# Patient Record
Sex: MALE | Race: BLACK OR AFRICAN AMERICAN | NOT HISPANIC OR LATINO | Employment: FULL TIME | URBAN - METROPOLITAN AREA
[De-identification: names, ages, dates, MRNs, and addresses within clinical notes are randomized per-mention and may not be internally consistent; named-entity substitution may affect disease eponyms.]

---

## 2024-01-11 ENCOUNTER — HOSPITAL ENCOUNTER (EMERGENCY)
Facility: HOSPITAL | Age: 64
Discharge: HOME | End: 2024-01-12
Attending: STUDENT IN AN ORGANIZED HEALTH CARE EDUCATION/TRAINING PROGRAM
Payer: COMMERCIAL

## 2024-01-11 ENCOUNTER — CLINICAL SUPPORT (OUTPATIENT)
Dept: EMERGENCY MEDICINE | Facility: HOSPITAL | Age: 64
End: 2024-01-11
Payer: COMMERCIAL

## 2024-01-11 ENCOUNTER — APPOINTMENT (OUTPATIENT)
Dept: RADIOLOGY | Facility: HOSPITAL | Age: 64
End: 2024-01-11
Payer: COMMERCIAL

## 2024-01-11 DIAGNOSIS — R07.89 CHEST WALL PAIN: ICD-10-CM

## 2024-01-11 DIAGNOSIS — G89.29 CHRONIC NONINTRACTABLE HEADACHE, UNSPECIFIED HEADACHE TYPE: Primary | ICD-10-CM

## 2024-01-11 DIAGNOSIS — R51.9 CHRONIC NONINTRACTABLE HEADACHE, UNSPECIFIED HEADACHE TYPE: Primary | ICD-10-CM

## 2024-01-11 DIAGNOSIS — R10.84 GENERALIZED ABDOMINAL PAIN: ICD-10-CM

## 2024-01-11 LAB
ALBUMIN SERPL BCP-MCNC: 4.3 G/DL (ref 3.4–5)
ALP SERPL-CCNC: 89 U/L (ref 33–136)
ALT SERPL W P-5'-P-CCNC: 21 U/L (ref 10–52)
ANION GAP SERPL CALC-SCNC: 13 MMOL/L (ref 10–20)
APPEARANCE UR: CLEAR
APTT PPP: 36 SECONDS (ref 27–38)
AST SERPL W P-5'-P-CCNC: 20 U/L (ref 9–39)
BASOPHILS # BLD AUTO: 0.05 X10*3/UL (ref 0–0.1)
BASOPHILS NFR BLD AUTO: 0.6 %
BILIRUB SERPL-MCNC: 0.4 MG/DL (ref 0–1.2)
BILIRUB UR STRIP.AUTO-MCNC: NEGATIVE MG/DL
BUN SERPL-MCNC: 15 MG/DL (ref 6–23)
CALCIUM SERPL-MCNC: 9.5 MG/DL (ref 8.6–10.6)
CARDIAC TROPONIN I PNL SERPL HS: 15 NG/L (ref 0–53)
CHLORIDE SERPL-SCNC: 103 MMOL/L (ref 98–107)
CO2 SERPL-SCNC: 27 MMOL/L (ref 21–32)
COLOR UR: NORMAL
CREAT SERPL-MCNC: 0.86 MG/DL (ref 0.5–1.3)
EGFRCR SERPLBLD CKD-EPI 2021: >90 ML/MIN/1.73M*2
EOSINOPHIL # BLD AUTO: 0.4 X10*3/UL (ref 0–0.7)
EOSINOPHIL NFR BLD AUTO: 4.4 %
ERYTHROCYTE [DISTWIDTH] IN BLOOD BY AUTOMATED COUNT: 14.3 % (ref 11.5–14.5)
GLUCOSE SERPL-MCNC: 104 MG/DL (ref 74–99)
GLUCOSE UR STRIP.AUTO-MCNC: NEGATIVE MG/DL
HCT VFR BLD AUTO: 37.2 % (ref 41–52)
HGB BLD-MCNC: 12.8 G/DL (ref 13.5–17.5)
IMM GRANULOCYTES # BLD AUTO: 0.02 X10*3/UL (ref 0–0.7)
IMM GRANULOCYTES NFR BLD AUTO: 0.2 % (ref 0–0.9)
INR PPP: 1 (ref 0.9–1.1)
KETONES UR STRIP.AUTO-MCNC: NEGATIVE MG/DL
LEUKOCYTE ESTERASE UR QL STRIP.AUTO: NEGATIVE
LYMPHOCYTES # BLD AUTO: 2.52 X10*3/UL (ref 1.2–4.8)
LYMPHOCYTES NFR BLD AUTO: 28 %
MCH RBC QN AUTO: 28.6 PG (ref 26–34)
MCHC RBC AUTO-ENTMCNC: 34.4 G/DL (ref 32–36)
MCV RBC AUTO: 83 FL (ref 80–100)
MONOCYTES # BLD AUTO: 0.76 X10*3/UL (ref 0.1–1)
MONOCYTES NFR BLD AUTO: 8.5 %
NEUTROPHILS # BLD AUTO: 5.24 X10*3/UL (ref 1.2–7.7)
NEUTROPHILS NFR BLD AUTO: 58.3 %
NITRITE UR QL STRIP.AUTO: NEGATIVE
NRBC BLD-RTO: 0 /100 WBCS (ref 0–0)
PH UR STRIP.AUTO: 6 [PH]
PLATELET # BLD AUTO: 276 X10*3/UL (ref 150–450)
POTASSIUM SERPL-SCNC: 4 MMOL/L (ref 3.5–5.3)
PROT SERPL-MCNC: 7.1 G/DL (ref 6.4–8.2)
PROT UR STRIP.AUTO-MCNC: NEGATIVE MG/DL
PROTHROMBIN TIME: 11.7 SECONDS (ref 9.8–12.8)
RBC # BLD AUTO: 4.47 X10*6/UL (ref 4.5–5.9)
RBC # UR STRIP.AUTO: NEGATIVE /UL
SODIUM SERPL-SCNC: 139 MMOL/L (ref 136–145)
SP GR UR STRIP.AUTO: 1.01
UROBILINOGEN UR STRIP.AUTO-MCNC: <2 MG/DL
WBC # BLD AUTO: 9 X10*3/UL (ref 4.4–11.3)

## 2024-01-11 PROCEDURE — 99285 EMERGENCY DEPT VISIT HI MDM: CPT | Performed by: STUDENT IN AN ORGANIZED HEALTH CARE EDUCATION/TRAINING PROGRAM

## 2024-01-11 PROCEDURE — 83690 ASSAY OF LIPASE: CPT

## 2024-01-11 PROCEDURE — 36415 COLL VENOUS BLD VENIPUNCTURE: CPT | Performed by: EMERGENCY MEDICINE

## 2024-01-11 PROCEDURE — 81003 URINALYSIS AUTO W/O SCOPE: CPT

## 2024-01-11 PROCEDURE — 84484 ASSAY OF TROPONIN QUANT: CPT | Performed by: STUDENT IN AN ORGANIZED HEALTH CARE EDUCATION/TRAINING PROGRAM

## 2024-01-11 PROCEDURE — 93010 ELECTROCARDIOGRAM REPORT: CPT | Performed by: STUDENT IN AN ORGANIZED HEALTH CARE EDUCATION/TRAINING PROGRAM

## 2024-01-11 PROCEDURE — 83880 ASSAY OF NATRIURETIC PEPTIDE: CPT

## 2024-01-11 PROCEDURE — 2500000004 HC RX 250 GENERAL PHARMACY W/ HCPCS (ALT 636 FOR OP/ED)

## 2024-01-11 PROCEDURE — 93005 ELECTROCARDIOGRAM TRACING: CPT

## 2024-01-11 PROCEDURE — 84075 ASSAY ALKALINE PHOSPHATASE: CPT | Performed by: STUDENT IN AN ORGANIZED HEALTH CARE EDUCATION/TRAINING PROGRAM

## 2024-01-11 PROCEDURE — 85730 THROMBOPLASTIN TIME PARTIAL: CPT

## 2024-01-11 PROCEDURE — 71045 X-RAY EXAM CHEST 1 VIEW: CPT | Performed by: STUDENT IN AN ORGANIZED HEALTH CARE EDUCATION/TRAINING PROGRAM

## 2024-01-11 PROCEDURE — 71045 X-RAY EXAM CHEST 1 VIEW: CPT

## 2024-01-11 PROCEDURE — 85025 COMPLETE CBC W/AUTO DIFF WBC: CPT | Performed by: STUDENT IN AN ORGANIZED HEALTH CARE EDUCATION/TRAINING PROGRAM

## 2024-01-11 PROCEDURE — 99284 EMERGENCY DEPT VISIT MOD MDM: CPT | Mod: 25 | Performed by: STUDENT IN AN ORGANIZED HEALTH CARE EDUCATION/TRAINING PROGRAM

## 2024-01-11 PROCEDURE — 96374 THER/PROPH/DIAG INJ IV PUSH: CPT

## 2024-01-11 PROCEDURE — 36415 COLL VENOUS BLD VENIPUNCTURE: CPT

## 2024-01-11 PROCEDURE — 85025 COMPLETE CBC W/AUTO DIFF WBC: CPT | Performed by: EMERGENCY MEDICINE

## 2024-01-11 RX ORDER — MORPHINE SULFATE 4 MG/ML
4 INJECTION INTRAVENOUS ONCE
Status: COMPLETED | OUTPATIENT
Start: 2024-01-11 | End: 2024-01-11

## 2024-01-11 RX ADMIN — MORPHINE SULFATE 4 MG: 4 INJECTION INTRAVENOUS at 23:54

## 2024-01-11 ASSESSMENT — COLUMBIA-SUICIDE SEVERITY RATING SCALE - C-SSRS
2. HAVE YOU ACTUALLY HAD ANY THOUGHTS OF KILLING YOURSELF?: NO
6. HAVE YOU EVER DONE ANYTHING, STARTED TO DO ANYTHING, OR PREPARED TO DO ANYTHING TO END YOUR LIFE?: NO
1. IN THE PAST MONTH, HAVE YOU WISHED YOU WERE DEAD OR WISHED YOU COULD GO TO SLEEP AND NOT WAKE UP?: NO

## 2024-01-11 ASSESSMENT — PAIN DESCRIPTION - PAIN TYPE: TYPE: CHRONIC PAIN

## 2024-01-11 ASSESSMENT — PAIN SCALES - GENERAL
PAINLEVEL_OUTOF10: 8
PAINLEVEL_OUTOF10: 9

## 2024-01-11 ASSESSMENT — PAIN DESCRIPTION - LOCATION: LOCATION: HEAD

## 2024-01-11 ASSESSMENT — PAIN - FUNCTIONAL ASSESSMENT: PAIN_FUNCTIONAL_ASSESSMENT: 0-10

## 2024-01-12 ENCOUNTER — APPOINTMENT (OUTPATIENT)
Dept: RADIOLOGY | Facility: HOSPITAL | Age: 64
End: 2024-01-12
Payer: COMMERCIAL

## 2024-01-12 VITALS
DIASTOLIC BLOOD PRESSURE: 77 MMHG | WEIGHT: 180 LBS | BODY MASS INDEX: 26.66 KG/M2 | HEIGHT: 69 IN | HEART RATE: 63 BPM | TEMPERATURE: 97.5 F | SYSTOLIC BLOOD PRESSURE: 135 MMHG | RESPIRATION RATE: 19 BRPM | OXYGEN SATURATION: 93 %

## 2024-01-12 LAB
ANION GAP BLDV CALCULATED.4IONS-SCNC: 8 MMOL/L (ref 10–25)
ATRIAL RATE: 66 BPM
BASE EXCESS BLDV CALC-SCNC: 2.8 MMOL/L (ref -2–3)
BNP SERPL-MCNC: 11 PG/ML (ref 0–99)
BODY TEMPERATURE: 37 DEGREES CELSIUS
CA-I BLDV-SCNC: 1.23 MMOL/L (ref 1.1–1.33)
CARDIAC TROPONIN I PNL SERPL HS: 14 NG/L (ref 0–53)
CHLORIDE BLDV-SCNC: 105 MMOL/L (ref 98–107)
GLUCOSE BLDV-MCNC: 98 MG/DL (ref 74–99)
HCO3 BLDV-SCNC: 27.9 MMOL/L (ref 22–26)
HCT VFR BLD EST: 38 % (ref 41–52)
HGB BLDV-MCNC: 12.7 G/DL (ref 13.5–17.5)
HOLD SPECIMEN: NORMAL
INHALED O2 CONCENTRATION: 100 %
LACTATE BLDV-SCNC: 1.1 MMOL/L (ref 0.4–2)
LIPASE SERPL-CCNC: 36 U/L (ref 9–82)
OXYHGB MFR BLDV: 88.9 % (ref 45–75)
P AXIS: 72 DEGREES
P OFFSET: 194 MS
P ONSET: 137 MS
PCO2 BLDV: 44 MM HG (ref 41–51)
PH BLDV: 7.41 PH (ref 7.33–7.43)
PO2 BLDV: 63 MM HG (ref 35–45)
POTASSIUM BLDV-SCNC: 3.7 MMOL/L (ref 3.5–5.3)
PR INTERVAL: 150 MS
Q ONSET: 212 MS
QRS COUNT: 11 BEATS
QRS DURATION: 86 MS
QT INTERVAL: 376 MS
QTC CALCULATION(BAZETT): 394 MS
QTC FREDERICIA: 388 MS
R AXIS: -55 DEGREES
SAO2 % BLDV: 92 % (ref 45–75)
SODIUM BLDV-SCNC: 137 MMOL/L (ref 136–145)
T AXIS: 7 DEGREES
T OFFSET: 400 MS
VENTRICULAR RATE: 66 BPM

## 2024-01-12 PROCEDURE — 70450 CT HEAD/BRAIN W/O DYE: CPT

## 2024-01-12 PROCEDURE — 84132 ASSAY OF SERUM POTASSIUM: CPT

## 2024-01-12 PROCEDURE — 84484 ASSAY OF TROPONIN QUANT: CPT | Performed by: STUDENT IN AN ORGANIZED HEALTH CARE EDUCATION/TRAINING PROGRAM

## 2024-01-12 PROCEDURE — 36415 COLL VENOUS BLD VENIPUNCTURE: CPT | Performed by: STUDENT IN AN ORGANIZED HEALTH CARE EDUCATION/TRAINING PROGRAM

## 2024-01-12 PROCEDURE — 36415 COLL VENOUS BLD VENIPUNCTURE: CPT

## 2024-01-12 PROCEDURE — 70450 CT HEAD/BRAIN W/O DYE: CPT | Performed by: STUDENT IN AN ORGANIZED HEALTH CARE EDUCATION/TRAINING PROGRAM

## 2024-01-12 RX ORDER — ACETAMINOPHEN 325 MG/1
650 TABLET ORAL ONCE
Status: COMPLETED | OUTPATIENT
Start: 2024-01-12 | End: 2024-01-12

## 2024-01-12 RX ADMIN — ACETAMINOPHEN 650 MG: 325 TABLET ORAL at 00:58

## 2024-01-12 ASSESSMENT — PAIN SCALES - GENERAL: PAINLEVEL_OUTOF10: 7

## 2024-01-12 NOTE — ED PROVIDER NOTES
CC: Chest Pain     HPI:  Klaus Proctor is a 63 y.o. male with a PMHx of CVA (5 wks ago), remote history of MI, CHF, hx substance use (cocaine, fentanyl), who presents with headache and chest pain for 5 weeks following recent stroke.  He reports that he has had a headache ever since his stroke, as well as central, aching chest pain which radiates to his left arm and left side of his neck.  He reports he is having difficulty with dep breaths due to the chest pain. He also reports abdominal pain related to a large ventral hernia. He has some constipation at baseline but has been able to have normal bowel movements.    He also reports that his teeth have been loose since his stroke, and was recently treated with antibiotics for a dental infection.  He is unsure what antibiotic he was given.     Of note he has residual left lower extremity deficits following his stroke 5 weeks ago.    Patient reports an anaphylactic allergy to lisinopril, reports he has been told he is not supposed to have NSAIDs.  However he also reports he has been taking ibuprofen at home due to dental pain.     No fever/chills, nausea/vomiting, no dysuria, no leg swelling.    On further history-taking, patient mentions he hears voices and sees things that are not there. He reports he takes zoloft and cogentin for this issue.     Limitations to History: none  Additional History provided by: N/A    External Records Reviewed:  No past/external records available.    PMHx/PSHx:  Per HPI.   - has a past medical history of Diabetes mellitus (CMS/[facility]), Hypertension, MI (myocardial infarction) (CMS/[facility]), and Stroke (CMS/[facility]).  - has no past surgical history on file.    Medications:  Reviewed in EMR. See EMR for complete list of medications and doses.    Allergies:  Lisinopril    Social History:  - Tobacco:  reports that he has been smoking cigarettes. He has been smoking an average of .5 packs per day. He has never used smokeless tobacco.   - Alcohol:  reports  that he does not currently use alcohol.   - Illicit Drugs:  reports current drug use. Drugs: Cocaine and Fentanyl. Stopped use 5 weeks ago.    ROS:  Per HPI.     ???????????????????????????????????????????????????????????????  Triage Vitals:  T 36.7 °C (98.1 °F)  HR 71  /81  RR 18  O2 97 %      Physical Exam  Vitals reviewed.   Constitutional:       General: He is not in acute distress.     Appearance: He is well-developed.   HENT:      Head: Normocephalic and atraumatic.      Mouth/Throat:      Mouth: Mucous membranes are moist. No oral lesions.      Pharynx: Oropharynx is clear.      Comments: Poor dentition, notably there is a broken left mandibular tooth.  No edema, no evidence of abscess.  Eyes:      Extraocular Movements: Extraocular movements intact.      Pupils: Pupils are equal, round, and reactive to light.   Cardiovascular:      Rate and Rhythm: Normal rate and regular rhythm.      Heart sounds: No murmur heard.     No friction rub. No gallop.   Pulmonary:      Effort: Pulmonary effort is normal. No respiratory distress.      Breath sounds: Normal breath sounds.   Chest:      Chest wall: Tenderness present. No crepitus.      Comments: Chest TTP immediately lateral to sternum on R  Abdominal:      Palpations: Abdomen is soft.      Tenderness: There is abdominal tenderness. There is no guarding.      Comments: Mild tenderness to palpation throughout, without guarding or rebound.  There is a large ventral abdominal hernia that is soft, easily reducible.   Musculoskeletal:         General: Normal range of motion.      Cervical back: Normal range of motion and neck supple.      Right lower leg: No tenderness. No edema.      Left lower leg: No tenderness. No edema.   Lymphadenopathy:      Cervical: No cervical adenopathy.   Skin:     General: Skin is warm and dry.      Findings: No rash.   Neurological:      Mental Status: He is alert and oriented to person, place, and time.      Comments: Strength 5  out of 5 in bilateral upper extremities and right lower extremity.  Left lower extremity strength 4 out of 5 (patient states this is stable since his stroke).  No facial asymmetry, speech is clear.   Psychiatric:         Mood and Affect: Mood normal.         Behavior: Behavior normal.       ???????????????????????????????????????????????????????????????  ED Course:  ED Course as of 01/12/24 0216   u Jan 11, 2024   2330 CBC with Differential(!)  CBC unremarkable [HH]   2332 XR chest 1 view  I independently interpreted the CXR w/o e/o PTX, PNA, or widened mediastinum. Radiology read with mild basilar atelectasis, no PNA or pulmonary edema, no osseous changes. []   2347 Troponin I, High Sensitivity: 15  Troponin negative []   2347 Comprehensive Metabolic Panel(!)  CMP unremarkable []   2348 Urinalysis with Reflex Culture and Microscopic  UA unremarkable [HH]   Fri Jan 12, 2024   0009 POCT Lactate, Venous: 1.1 [HH]   0010 Coagulation Screen  Coags wnl [HH]   0057 BNP: 11  BNP wnl [HH]   0058 LIPASE: 36  Lipase wnl [HH]   0058 Blood Gas Venous Full Panel(!)  VBG shows normal pH, normal lactate [HH]   0110 CT head wo IV contrast  I independently interpreted the CT head without e/o ICH or skull fracture. [HH]   0111 I independently interpreted the EKG: Approximately 65 bpm, normal sinus rhythm. Left axis deviation.  VA interval 150 ms, QRS 86 ms, no QTc prolongation.  No ST elevations, depressions, or T wave inversions concerning for ischemia.  There is no prior EKG available for comparison. [HH]   0214 Troponin I, High Sensitivity: 14  Delta trop stable, wnl [HH]      ED Course User Index  [HH] Hayde Murray MD         Diagnoses as of 01/12/24 0216   Chronic nonintractable headache, unspecified headache type   Chest wall pain   Generalized abdominal pain       EKG & Images:  Independently reviewed, See ED Course      MDM:  Klaus Proctor is a 63 y.o. male with a PMHx of CVA (5 wks ago), remote history of MI, CHF,  hx substance use (cocaine, fentanyl), who presents with headache and chest pain for 5 weeks following recent stroke.  On arrival to the emergency department, the patient is afebrile, hemodynamically stable and satting 97% on room air.  On my assessment, he ambulated to the bed and was resting comfortably.  Exam is significant for chest tenderness to palpation just lateral to the sternum on R, abdomen is significantly distended, there is mild diffuse tenderness without rebound, large ventral hernia, and left CVA tenderness.  No facial asymmetry, normal heart sounds, lungs CTAB, no LE edema. 4 mg morphine ordered for pain with subsequent improvement. Patient requesting ibuprofen for his tooth pain, however also states when he has aspirin his throat swells up. Tylenol was ordered, with some improvement.     CBC, CMP, UA were unremarkable. Chest pain is not exertional, EKG is nonischemic, and troponin was negative, so I have low suspicion for ACS. Chest is tender to palpation, which may indicate a musculoskeletal cause of pain.  CT head negative for intracranial hemorrhage, so I have low concern for hemorrhagic stroke.  Notably, the patient reports sinus congestion, which may be the cause of his headache. Given that the patient is having regular bowel movements, and on abdominal exam his ventral hernia is soft and reducible, I have low concern for strangulated or incarcerated hernia.  Lipase was negative, so low concern for pancreatitis. CXR shows mild basilar atelectasis, left greater than right, but no evidence of pulmonary edema, so I have low concern for heart failure exacerbation. No widened mediastinum on CXR. Tooth and jaw pain may be secondary to poor dentition, notably the patient has a broken tooth on the L and dental caries. He remained hemodynamically stable throughout his stay.  Prior to discharge, he was tolerating PO intake without difficulty.    Patient has an overall negative workup.  I discussed these  findings with the patient, who expressed understanding.  Given that he is tolerating PO intake, I believe he is appropriate for discharge.  I advised him to follow-up with his primary care physician for further evaluation.  I also recommended that he follow-up with a dentist for his tooth pain and dental caries. I reviewed strict return precautions with the patient, including any worsening of symptoms, inability to tolerate food or fluids, inability to have bowel movements or pass flatus, or any other concerns.      Final diagnoses:   [R51.9, G89.29] Chronic nonintractable headache, unspecified headache type   [R07.89] Chest wall pain   [R10.84] Generalized abdominal pain       Social Determinants Limiting Care:  Mental health issues    Disposition:  Discharge    Hayde Murray MD   Emergency Medicine PGY1  [facility]     Disclaimer: This note was dictated by speech recognition. Minor errors in transcription may be present     ? [employer] last updated 1/12/2024 1:54 AM        Hayde Murray MD  Resident  01/12/24 0239

## 2024-01-12 NOTE — DISCHARGE INSTRUCTIONS
You are seen in the emergency department for headache and chest pain.  Your workup was negative.  Your headache may be related to sinus congestion, you can take an over-the-counter decongestant to help.  For your pain, you can continue to take Tylenol at home.  You should try to establish with a primary care provider for further care.  You should return to the emergency department if you have worsening pain, inability to eat or drink, inability to have bowel movements, or any new concerns.